# Patient Record
Sex: FEMALE | Race: WHITE | NOT HISPANIC OR LATINO | Employment: UNEMPLOYED | URBAN - METROPOLITAN AREA
[De-identification: names, ages, dates, MRNs, and addresses within clinical notes are randomized per-mention and may not be internally consistent; named-entity substitution may affect disease eponyms.]

---

## 2022-02-02 ENCOUNTER — HISTORICAL (OUTPATIENT)
Dept: ADMINISTRATIVE | Facility: HOSPITAL | Age: 42
End: 2022-02-02

## 2023-09-07 DIAGNOSIS — M25.561 CHRONIC PAIN OF RIGHT KNEE: Primary | ICD-10-CM

## 2023-09-07 DIAGNOSIS — G89.29 CHRONIC PAIN OF RIGHT KNEE: Primary | ICD-10-CM

## 2023-10-05 ENCOUNTER — OFFICE VISIT (OUTPATIENT)
Dept: ORTHOPEDICS | Facility: CLINIC | Age: 43
End: 2023-10-05
Payer: MEDICAID

## 2023-10-05 ENCOUNTER — HOSPITAL ENCOUNTER (OUTPATIENT)
Dept: RADIOLOGY | Facility: HOSPITAL | Age: 43
Discharge: HOME | End: 2023-10-05
Attending: NURSE PRACTITIONER
Payer: MEDICAID

## 2023-10-05 VITALS — BODY MASS INDEX: 23 KG/M2 | WEIGHT: 129.81 LBS | HEIGHT: 63 IN

## 2023-10-05 DIAGNOSIS — M71.22 BAKER'S CYST OF KNEE, LEFT: ICD-10-CM

## 2023-10-05 DIAGNOSIS — M25.561 CHRONIC PAIN OF RIGHT KNEE: ICD-10-CM

## 2023-10-05 DIAGNOSIS — M76.31 IT BAND SYNDROME, RIGHT: Primary | ICD-10-CM

## 2023-10-05 DIAGNOSIS — G89.29 CHRONIC PAIN OF RIGHT KNEE: ICD-10-CM

## 2023-10-05 DIAGNOSIS — M62.81 MUSCLE WEAKNESS OF LOWER EXTREMITY: ICD-10-CM

## 2023-10-05 DIAGNOSIS — R26.9 ALTERED GAIT: ICD-10-CM

## 2023-10-05 PROCEDURE — 4010F ACE/ARB THERAPY RXD/TAKEN: CPT | Mod: CPTII,,, | Performed by: NURSE PRACTITIONER

## 2023-10-05 PROCEDURE — 3008F BODY MASS INDEX DOCD: CPT | Mod: CPTII,,, | Performed by: NURSE PRACTITIONER

## 2023-10-05 PROCEDURE — 1159F PR MEDICATION LIST DOCUMENTED IN MEDICAL RECORD: ICD-10-PCS | Mod: CPTII,,, | Performed by: NURSE PRACTITIONER

## 2023-10-05 PROCEDURE — 99213 OFFICE O/P EST LOW 20 MIN: CPT | Mod: PBBFAC,25 | Performed by: NURSE PRACTITIONER

## 2023-10-05 PROCEDURE — 3008F PR BODY MASS INDEX (BMI) DOCUMENTED: ICD-10-PCS | Mod: CPTII,,, | Performed by: NURSE PRACTITIONER

## 2023-10-05 PROCEDURE — 99205 PR OFFICE/OUTPT VISIT, NEW, LEVL V, 60-74 MIN: ICD-10-PCS | Mod: S$PBB,25,, | Performed by: NURSE PRACTITIONER

## 2023-10-05 PROCEDURE — 4010F PR ACE/ARB THEARPY RXD/TAKEN: ICD-10-PCS | Mod: CPTII,,, | Performed by: NURSE PRACTITIONER

## 2023-10-05 PROCEDURE — 20610 DRAIN/INJ JOINT/BURSA W/O US: CPT | Mod: PBBFAC,RT | Performed by: NURSE PRACTITIONER

## 2023-10-05 PROCEDURE — 1159F MED LIST DOCD IN RCRD: CPT | Mod: CPTII,,, | Performed by: NURSE PRACTITIONER

## 2023-10-05 PROCEDURE — 1160F PR REVIEW ALL MEDS BY PRESCRIBER/CLIN PHARMACIST DOCUMENTED: ICD-10-PCS | Mod: CPTII,,, | Performed by: NURSE PRACTITIONER

## 2023-10-05 PROCEDURE — 99205 OFFICE O/P NEW HI 60 MIN: CPT | Mod: S$PBB,25,, | Performed by: NURSE PRACTITIONER

## 2023-10-05 PROCEDURE — 73564 X-RAY EXAM KNEE 4 OR MORE: CPT | Mod: TC,RT

## 2023-10-05 PROCEDURE — 1160F RVW MEDS BY RX/DR IN RCRD: CPT | Mod: CPTII,,, | Performed by: NURSE PRACTITIONER

## 2023-10-05 PROCEDURE — 20610 LARGE JOINT ASPIRATION/INJECTION: R KNEE: ICD-10-PCS | Mod: S$PBB,RT,, | Performed by: NURSE PRACTITIONER

## 2023-10-05 RX ORDER — TRIAMCINOLONE ACETONIDE 40 MG/ML
40 INJECTION, SUSPENSION INTRA-ARTICULAR; INTRAMUSCULAR
Status: COMPLETED | OUTPATIENT
Start: 2023-10-05 | End: 2023-10-05

## 2023-10-05 RX ORDER — MELOXICAM 7.5 MG/1
7.5 TABLET ORAL DAILY PRN
Qty: 30 TABLET | Refills: 1 | Status: SHIPPED | OUTPATIENT
Start: 2023-10-05 | End: 2023-12-04

## 2023-10-05 RX ORDER — HYDROXYZINE PAMOATE 50 MG/1
50 CAPSULE ORAL
COMMUNITY
Start: 2023-08-15

## 2023-10-05 RX ORDER — BUSPIRONE HYDROCHLORIDE 10 MG/1
10 TABLET ORAL 3 TIMES DAILY
COMMUNITY
Start: 2023-08-15

## 2023-10-05 RX ORDER — FLUOXETINE HYDROCHLORIDE 40 MG/1
40 CAPSULE ORAL EVERY MORNING
COMMUNITY
Start: 2023-08-01

## 2023-10-05 RX ORDER — QUETIAPINE FUMARATE 100 MG/1
100 TABLET, FILM COATED ORAL NIGHTLY
COMMUNITY
Start: 2023-07-24

## 2023-10-05 RX ORDER — OMEPRAZOLE 40 MG/1
CAPSULE, DELAYED RELEASE ORAL
COMMUNITY

## 2023-10-05 RX ORDER — LIDOCAINE HYDROCHLORIDE 10 MG/ML
5 INJECTION, SOLUTION EPIDURAL; INFILTRATION; INTRACAUDAL; PERINEURAL
Status: COMPLETED | OUTPATIENT
Start: 2023-10-05 | End: 2023-10-05

## 2023-10-05 RX ORDER — LISINOPRIL 10 MG/1
10 TABLET ORAL EVERY MORNING
COMMUNITY
Start: 2023-07-05

## 2023-10-05 RX ADMIN — TRIAMCINOLONE ACETONIDE 40 MG: 40 INJECTION, SUSPENSION INTRA-ARTICULAR; INTRAMUSCULAR at 07:10

## 2023-10-05 RX ADMIN — LIDOCAINE HYDROCHLORIDE 5 ML: 10 INJECTION, SOLUTION EPIDURAL; INFILTRATION; INTRACAUDAL; PERINEURAL at 07:10

## 2023-10-05 NOTE — PROGRESS NOTES
"  Subjective:   PATIENT ID: Soledad Andrews is a 43 y.o. female. Smoker. 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    Seen [facility] for same DX since n/a.   CHIEF COMPLAINT: Knee Pain of the Right Knee    HPI:    Right stabbing lateral knee pain.   Injury:  fell onto patellar in softball in highschool  Onset: several years ago fluctuates   Modifying Factors: worse with activity and improves with rest  Associated Symptoms: crepitus, swelling, "giving out", and "popping"   Activity: sedentary with light activity and pain moderately interferes with ADLs   Previous Treatments:  soft knee splint, HEP withTheraBand, RX NSAIDs, and RX PT completed 2 wks/ 1 xs per week d/c'd 2013  with some relief but symptoms returning  PMH: negative for contraindications for NSAID use  Family History: - OA    NOTE: New patient referred for right knee pain since high school with limited conservative treatments.  Symptoms affecting ADLs and ability to work.   Also c/o radiating pain to hip and foot with numbness in foot which she states is being caused by knee pain.  Patient frustrated with prolonged course of knee pain and desiring surgical options.      Current Outpatient Medications:     busPIRone (BUSPAR) 10 MG tablet, Take 10 mg by mouth 3 (three) times daily., Disp: , Rfl:     FLUoxetine 40 MG capsule, Take 40 mg by mouth every morning., Disp: , Rfl:     hydrOXYzine pamoate (VISTARIL) 50 MG Cap, Take 50 mg by mouth., Disp: , Rfl:     lisinopriL 10 MG tablet, Take 10 mg by mouth every morning., Disp: , Rfl:     omeprazole (PRILOSEC) 40 MG capsule, omeprazole Take No date recorded No form recorded No frequency recorded No route recorded No set duration recorded No set duration amount recorded active No dosage strength recorded No dosage strength units of measure recorded, Disp: , Rfl:     QUEtiapine (SEROQUEL) 100 MG Tab, Take 100 mg by mouth every evening., Disp: , Rfl:   Review of patient's allergies " "indicates:  No Known Allergies  No results found for: "HGBA1C"   Body mass index is 22.99 kg/m².   Vitals:    10/05/23 0746 10/05/23 0747   Weight: 58.9 kg (129 lb 12.8 oz)    Height: 5' 3" (1.6 m)    PainSc:    7      REVIEW OF SYSTEMS:  A ten-point review of systems was performed and is negative, except as mentioned above   Objective:   MSK Knee Exam  General:  no apparent distress, no pain indicators,  well nourished  Inspection:   lower extremities in proportion with overall body habitus, no erythema   , limping gait and right LE favoring, with walking patient elevates medial foot and applies pressure to lateral surface of foot, full weight bearing   LEFT KNEE RIGHT KNEE   no knee effusion, normal alignment, no contusion, no masses, no significant scars noted mild knee effusion, knee valgus with forefoot pronation, no contusion, no masses, no scars   Palpation:     LEFT KNEE RIGHT KNEE   normal temperature, no tenderness noted w/ palpation, no patellar grind with patella compression and no patellar facet pain, no crepitus, no quad deconditioning, no active mechanical locking noted w/ joint movement,  no tenderness of patellar tendon or tibial plateau, no fullness noted to popliteal bursa  normal temperature, noted tenderness over the lateral joint line and femorale condyle at IT band crossing, no patellar grind with patella compression and no patellar facet pain, no  crepitus, mild quad deconditioning, no active mechanical locking noted w/ joint movement,  no tenderness of pes anserine, patellar tendon or tibial plateau, mild fullness noted to popliteal bursa     ROM Active Flexion / Extension (0-140)  Left 0 / 135 w/o pain Right 0 / 121 w/ pain at extreme flexion/extension   Strength Flexion / Extension (5 / 5)  Left 5 / 5 Right 4 / 4     Special Testing:         IT Band Syndrome L+ L-- R+ R-- Not Tested    Victor Hugo's Test [] [x] [x] [] []    Joint Effusion         Ballotable Effusion [] [x] [] [x] []    Fluid Wave " [] [x] [] [x] []    Patellar Testing         Apprehension [] [x] [] [x] []    Meniscal Injury         Ras's Test [] [x] [] [x] []    Thessaly's Test [] [] [] [] [x]    Ligament Injury         ACL Anterior Drawer [] [x] [] [x] []    PCL Posterior Drawer [] [x] [] [x] []    LCL Varus Test [] [x] [] [x] []    MCL Valgus Test [] [x] [] [x] []      Hip Exam normal  Ankle Exam normal  Neurovascular: Intact to light touch  Neuro/ Psych: Awake, alert, oriented, normal mood and affect  Lymphatic: No LAD  Skin/ Soft Tissue: no rash, skin intact  Cardio: no edema, vascular integrity noted  Assessment:   IMAGING: X-ray 4 views of right knee ordered, reviewed and independently interpreted by me. Discussed with patient. No acute findings .  Awaiting radiologist findings.     MRI Knee Without Contrast Right 9/1/23  Impression  Likely ganglion cyst at the posterior medial aspect of the knee joint. While the medial meniscus appears intact, proximity of stenosis does raise the possibility of occult tear leading to parameniscal cyst.  Narrative  CLINICAL HISTORY:    Knee pain, history of cyst  COMPARISON:  Radiographs dated 8/23/2023.  TECHNIQUE:  Multiplanar MRI of the right knee was performed utilizing T1-weighted and fluid sensitive sequences.   CONTRAST:  None.  COMMENT:     There is no effusion or Baker's cyst. A 1.2 x 0.8 x 0.5 cm well-circumscribed fluid collection is seen at the posteromedial aspect of the knee joint, with adjacent 1 mm collection in the medial meniscal posterior horn.  The menisci, cruciate and medial collateral ligaments, lateral collateral ligamentous complex and extensor mechanism are intact.  There is no muscle atrophy or edema. The semimembranosus, biceps femoris, and popliteus tendons are intact.  No full thickness cartilage defects are seen.     MRI Hip Without Contrast Right 9/1/23  Impression  1. Tiny amount of fluid signal of the acetabular labrum identified without displacement of the  acetabular cartilage. Small acetabular labral tear cannot be excluded. If clinically warranted right hip MRI arthrogram is suggested.   2. No acute bony injuries. No evidence of avascular necrosis identified.   3. Incidental bilateral ovarian cystic changes.  Narrative  HISTORY/TECHNIQUE: Multiplanar, multiecho scanning of the right hip joint is completed. No contrast was administered. History of right hip pain. No prior images are available.   FINDINGS: The uterus is slightly deviated toward the left. Follicular ovarian cystic changes are noted bilaterally.   No acute bony injuries are identified. Bony signal intensity is not remarkable. Normal bony alignment. No evidence of avascular necrosis identified. There is no hip joint effusion. There is a tiny zone of fluid signal at the acetabulum best seen in the T2-weighted coronal plane images (series 7, images 13 and 14). Tiny nondisplaced labral tear cannot be excluded.    EMR REVIEW: completed with noted Referral documentation reviewed    DIAGNOSIS:  1. It band syndrome, right    2. Baker's cyst of knee, left    3. Altered gait    4. Muscle weakness of lower extremity    5. BMI 22.0-22.9, adult       Plan:     Orders Placed This Encounter    X-Ray Knee Complete 4 Or More Views Right     Ongoing education about DX and treatment recommendations including conservative treatments of daily HEP with TheraBand, BMI reduction goal 5-10% of body weight, muscle strengthening with use of stationary bike (RPM set at 80 or > with slow progression to goal of 40 minutes 3-4 times per week as tolerated), adequate vit D/C, glucosamine 1500 mg/day and daily acetaminophen 1000 mg 3 times/ day if able to tolerate.    Treatment plan: Moderate exacerbation of chronic Right  IT band syndrome with Baker's cyst complicated by LE weakness and dysfunctional gait   Recommend starting initial conservative treatments including: RX po NSAID , HEP with TheraBand > 6 weeks, formal RX PT, and CSI.   If inadequate at f/u will consider referral to ortho res. for surgical treatment options.   Procedure: CSI v. VS injections discussed, s/t failed conservative treatments patient consents to CSI today  for diagnostic reasons  RX Medications: continue medications as RX per PCP; RX meloxicam as directed, medications precautions given.   RTC: 4 months  NOTE:   Extensive time spent with patient educating on MRI findings which do not mandate surgery at this time.  Discussed treatment plan and rationale of need to improve function and weakness in order to improve symptoms r/t hip and foot pain.  Once PT completed if patient still wants surgical options I will refer her to ortho res. For eval at that time.        Caron Gleason FNP [facility]  Procedure Note:   Large Joint Aspiration/Injection: R knee    Date/Time: 10/5/2023 7:40 AM    Performed by: Caron Gleason NP  Authorized by: Caron Gleason NP    Location:  Knee  Site:  R knee  Medications:  5 mL LIDOCAINE 1% (PF) 50 mg / 5 mL; 40 mg KENALOG 40 mg / 1 mL     Ortho Procedure Note   Procedure: RIGHT Knee CSI lateral suprapatellar approach     Indications: Therapeutic Indication - Decrease pain, Increase range of motion and improve quality of life:   Risks:  Possible complications with the injection include bleeding, infection (.01%), tendon rupture, steroid flare, fat pad or soft tissue atrophy, skin depigmentation, allergic reaction to medications and vasovagal response. (steroid flare treatment is rest, ice, NSAIDs and resolves in 24-36 hours)  Consent:  Procedure, risks, benefits, and alternatives explained the patient, who voiced understanding and agreed to proceed with procedure.  Consent signed and scanned into the medical record.   No absolute contraindications (cellulitis overlying joint, infection, lack of informed consent, allergy to injection medication, AVN protein or egg allergy for sodium hyaluronate,  or history of steroid flare) or relative contraindications (uncontrolled DM2 A1c>10, coagulopathy, INR > 3.5, previous joint replacement or history of AVN).        Staff: Caron MAURO  Description:  Time-out performed.  The patient was prepped in normal sterile fashion use of chlorhexidine scrub and the appropriate and anatomic landmarks were identified.  Sterile 21 gauge 1.5 inch  was used. Topical ethyl chloride was applied. Contents of syringe included:     RIGHT KNEE: 5 ml of 1% of lidocaine (PF) with 40 mg of Kenalog      Drainage: n/a   Complications: None   EBL: None   Post Procedure: Patient alert, and moving all extremities. ROM improved, pain decreased.  Good peripheral pulses, no signs of vascular compromise and range of motion intact.  Aftercare instructions were given to patient at time of discharge.  Relative rest for 3 days-avoiding excess activity.  Place ice on the area for 15 minutes every 4-6 hours. Patient may take Tylenol a 1000 mg b.i.d. or ibuprofen 600 mg t.i.d. for the next 3-4 days if not on medication already and safe to take pending co-morbidities.  Protect the area for the next 1-8 hours if anesthetic was used.  Avoid excessive activity for the next 3-4 weeks.  ER precautions given for fever, severe joint pain or allergic reaction or other new symptoms related to the joint injection.         Time Based Billing   Total Time Spent with Patient: 60 minutes Excludes Time Spent Performing Procedure  Visit Start Time: 0800  10 minutes spent prior to visit reviewing EMR, prior labs and x-rays.  35 minutes spent in visit with patient face-to-face time completing exam, obtaining history, educating on DX and treatment plan.  15 minutes spent after visit completing EMR documentation.   Visit End Time: 0900    Please be aware that this note has been generated with the assistance of Alexis voice-to-text.  Please excuse any spelling or grammatical errors.

## 2024-02-06 ENCOUNTER — OFFICE VISIT (OUTPATIENT)
Dept: ORTHOPEDICS | Facility: CLINIC | Age: 44
End: 2024-02-06
Payer: MEDICAID

## 2024-02-06 VITALS
BODY MASS INDEX: 23.63 KG/M2 | WEIGHT: 133.38 LBS | HEIGHT: 63 IN | TEMPERATURE: 99 F | DIASTOLIC BLOOD PRESSURE: 75 MMHG | SYSTOLIC BLOOD PRESSURE: 111 MMHG | HEART RATE: 85 BPM

## 2024-02-06 DIAGNOSIS — M71.22 BAKER'S CYST OF KNEE, LEFT: ICD-10-CM

## 2024-02-06 DIAGNOSIS — M76.31 IT BAND SYNDROME, RIGHT: Primary | ICD-10-CM

## 2024-02-06 PROCEDURE — 4010F ACE/ARB THERAPY RXD/TAKEN: CPT | Mod: CPTII,,, | Performed by: NURSE PRACTITIONER

## 2024-02-06 PROCEDURE — 99213 OFFICE O/P EST LOW 20 MIN: CPT | Mod: PBBFAC | Performed by: NURSE PRACTITIONER

## 2024-02-06 PROCEDURE — 3074F SYST BP LT 130 MM HG: CPT | Mod: CPTII,,, | Performed by: NURSE PRACTITIONER

## 2024-02-06 PROCEDURE — 99214 OFFICE O/P EST MOD 30 MIN: CPT | Mod: S$PBB,,, | Performed by: NURSE PRACTITIONER

## 2024-02-06 PROCEDURE — 1160F RVW MEDS BY RX/DR IN RCRD: CPT | Mod: CPTII,,, | Performed by: NURSE PRACTITIONER

## 2024-02-06 PROCEDURE — 1159F MED LIST DOCD IN RCRD: CPT | Mod: CPTII,,, | Performed by: NURSE PRACTITIONER

## 2024-02-06 PROCEDURE — 3008F BODY MASS INDEX DOCD: CPT | Mod: CPTII,,, | Performed by: NURSE PRACTITIONER

## 2024-02-06 PROCEDURE — 3078F DIAST BP <80 MM HG: CPT | Mod: CPTII,,, | Performed by: NURSE PRACTITIONER

## 2024-02-06 RX ORDER — MELOXICAM 7.5 MG/1
7.5 TABLET ORAL DAILY
Qty: 30 TABLET | Refills: 0 | Status: SHIPPED | OUTPATIENT
Start: 2024-02-06 | End: 2024-03-07

## 2024-02-06 NOTE — PROGRESS NOTES
"  Subjective:   PATIENT ID: Soledad Andrews is a 43 y.o. female. Smoker. 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    Seen [facility] for same DX since n/a.   CHIEF COMPLAINT: Knee Pain of the Right Knee (F/U Rt Knee pain. Pain Level 6-7 today. Taking meloxicam PRN)    HPI:    Right stabbing lateral knee pain.  Radiates into hip and into foot w/ numbness.   Injury:  fell onto patellar in softball in highschool  Onset: several years ago fluctuates   Modifying Factors: worse with activity and improves with rest  Associated Symptoms: crepitus, swelling, "giving out", and "popping"   Activity: sedentary with light activity and pain moderately interferes with ADLs   Previous Treatments:  soft knee splint, HEP withTheraBand, OTC pain relievers: acetaminophen, ibuprofen  , RX medications: meloxicam, RX PT completed 2 wks/ 1 xs per week d/c'd 2013, and CSI since 2023 last injection 10/5/23  with some relief but symptoms returning  PMH: negative for contraindications for NSAID use; HX drug abuse  Family History: - OA    NOTE: Follow up for right knee pain with Baker's cyst and IT Band Syndrome.  CSI given 10/5/23 for diagnostic reasons, injection provided some relief, lasting approximately 2 weeks.  Symptoms returning and continues with pain affecting ADLs.  Interested in surgical treatment options due to failed conservative treatments.   Completed 2 weeks of RX PT then did not return for further sessions due to PT making symptoms worse.  Also c/o radiating pain from hip and foot with numbness in foot which she adamantly believes is being caused by knee pain.  Patient frustrated with prolonged course of knee pain and desiring surgical options.  Requesting refill on meloxicam.     Current Outpatient Medications:     busPIRone (BUSPAR) 10 MG tablet, Take 10 mg by mouth 3 (three) times daily., Disp: , Rfl:     FLUoxetine 40 MG capsule, Take 40 mg by mouth every morning., Disp: , Rfl:     hydrOXYzine " "pamoate (VISTARIL) 50 MG Cap, Take 50 mg by mouth., Disp: , Rfl:     lisinopriL 10 MG tablet, Take 10 mg by mouth every morning., Disp: , Rfl:     omeprazole (PRILOSEC) 40 MG capsule, omeprazole Take No date recorded No form recorded No frequency recorded No route recorded No set duration recorded No set duration amount recorded active No dosage strength recorded No dosage strength units of measure recorded, Disp: , Rfl:     QUEtiapine (SEROQUEL) 100 MG Tab, Take 100 mg by mouth every evening., Disp: , Rfl:     meloxicam (MOBIC) 7.5 MG tablet, Take 1 tablet (7.5 mg total) by mouth once daily., Disp: 30 tablet, Rfl: 0  Review of patient's allergies indicates:  No Known Allergies  No results found for: "HGBA1C"   Body mass index is 23.63 kg/m².   Vitals:    02/06/24 0742 02/06/24 0743   BP:  111/75   Pulse:  85   Temp:  99 °F (37.2 °C)   TempSrc:  Oral   Weight: 60.5 kg (133 lb 6.4 oz)    Height: 5' 3" (1.6 m)    PainSc:    6      REVIEW OF SYSTEMS:  A ten-point review of systems was performed and is negative, except as mentioned above   Objective:   MSK Knee Exam  General:  no apparent distress, no pain indicators,  well nourished  Inspection:   lower extremities in proportion with overall body habitus, no erythema   , limping gait and right LE favoring, with walking patient elevates medial foot and applies pressure to lateral surface of foot, full weight bearing   LEFT KNEE RIGHT KNEE   no knee effusion, normal alignment, no contusion, no masses, no significant scars noted mild knee effusion, knee valgus with forefoot pronation, no contusion, no masses, no scars   Palpation:     LEFT KNEE RIGHT KNEE   normal temperature, no tenderness noted w/ palpation, no patellar grind with patella compression and no patellar facet pain, no crepitus, no quad deconditioning, no active mechanical locking noted w/ joint movement,  no tenderness of patellar tendon or tibial plateau, no fullness noted to popliteal bursa  normal " temperature, noted tenderness over the lateral joint line and femorale condyle at IT band crossing, no patellar grind with patella compression and no patellar facet pain, no  crepitus, mild quad deconditioning, no active mechanical locking noted w/ joint movement,  no tenderness of pes anserine, patellar tendon or tibial plateau, mild fullness noted to popliteal bursa     ROM Active Flexion / Extension (0-140)  Left 0 / 135 w/o pain Right 0 / 121 w/ pain at extreme flexion/extension   Strength Flexion / Extension (5 / 5)  Left 5 / 5 Right 4 / 4     Special Testing:         IT Band Syndrome L+ L-- R+ R-- Not Tested    Victor Hugo's Test [] [x] [x] [] []    Joint Effusion         Ballotable Effusion [] [x] [] [x] []    Fluid Wave [] [x] [] [x] []    Patellar Testing         Apprehension [] [x] [] [x] []    Meniscal Injury         Ras's Test [] [x] [] [x] []    Thessaly's Test [] [] [] [] [x]    Ligament Injury         ACL Anterior Drawer [] [x] [] [x] []    PCL Posterior Drawer [] [x] [] [x] []    LCL Varus Test [] [x] [] [x] []    MCL Valgus Test [] [x] [] [x] []      Hip Exam normal  Ankle Exam normal  Neurovascular: Intact to light touch  Neuro/ Psych: Awake, alert, oriented, normal mood and affect  Lymphatic: No LAD  Skin/ Soft Tissue: no rash, skin intact  Cardio: no edema, vascular integrity noted  Assessment:   IMAGING: X-ray 4 views of right knee dated 10/5/23, reviewed and independently interpreted by me. Discussed with patient. No acute findings .    XR KNEE COMP 4 OR MORE VIEWS RIGHT 10/5/23  CLINICAL HISTORY:  Pain in right knee  COMPARISON:  None.  FINDINGS:  No acute displaced fractures or dislocations.  Joint spaces preserved.  No blastic or lytic lesions.  Soft tissues within normal limits.  Impression:  No acute osseous abnormality.    MRI Knee Without Contrast Right 9/1/23  Impression  Likely ganglion cyst at the posterior medial aspect of the knee joint. While the medial meniscus appears intact,  proximity of stenosis does raise the possibility of occult tear leading to parameniscal cyst.  Narrative  CLINICAL HISTORY:    Knee pain, history of cyst  COMPARISON:  Radiographs dated 8/23/2023.  TECHNIQUE:  Multiplanar MRI of the right knee was performed utilizing T1-weighted and fluid sensitive sequences.   CONTRAST:  None.  COMMENT:     There is no effusion or Baker's cyst. A 1.2 x 0.8 x 0.5 cm well-circumscribed fluid collection is seen at the posteromedial aspect of the knee joint, with adjacent 1 mm collection in the medial meniscal posterior horn.  The menisci, cruciate and medial collateral ligaments, lateral collateral ligamentous complex and extensor mechanism are intact.  There is no muscle atrophy or edema. The semimembranosus, biceps femoris, and popliteus tendons are intact.  No full thickness cartilage defects are seen.     MRI Hip Without Contrast Right 9/1/23  Impression  1. Tiny amount of fluid signal of the acetabular labrum identified without displacement of the acetabular cartilage. Small acetabular labral tear cannot be excluded. If clinically warranted right hip MRI arthrogram is suggested.   2. No acute bony injuries. No evidence of avascular necrosis identified.   3. Incidental bilateral ovarian cystic changes.  Narrative  HISTORY/TECHNIQUE: Multiplanar, multiecho scanning of the right hip joint is completed. No contrast was administered. History of right hip pain. No prior images are available.   FINDINGS: The uterus is slightly deviated toward the left. Follicular ovarian cystic changes are noted bilaterally.   No acute bony injuries are identified. Bony signal intensity is not remarkable. Normal bony alignment. No evidence of avascular necrosis identified. There is no hip joint effusion. There is a tiny zone of fluid signal at the acetabulum best seen in the T2-weighted coronal plane images (series 7, images 13 and 14). Tiny nondisplaced labral tear cannot be excluded.    EMR REVIEW:  completed with noted prior visit 10/5/23 reviewed.    DIAGNOSIS:  1. It band syndrome, right    2. Baker's cyst of knee, left    3. BMI 23.0-23.9, adult      Plan:     Orders Placed This Encounter    meloxicam (MOBIC) 7.5 MG tablet     Ongoing education about DX and treatment recommendations including conservative treatments of daily HEP with TheraBand, BMI reduction goal 5-10% of body weight, muscle strengthening with use of stationary bike (RPM set at 80 or > with slow progression to goal of 40 minutes 3-4 times per week as tolerated), adequate vit D/C, glucosamine 1500 mg/day and daily acetaminophen 1000 mg 3 times/ day if able to tolerate.    Treatment plan: Moderate exacerbation of chronic Right  IT band syndrome with Baker's cyst complicated by LE weakness and dysfunctional gait   Extended time spent discussing MRI findings and educating patient on treatment options.  Aware findings of MRI, XR and exam do not mandate surgery.  Discussed non-surgical treatments including RX PT and CSI for symptoms relief.  Patient desires surgical treatment options at this time.  I will refer to [facility] orthopedic surgeon residents for evaluation. Patient is aware I am not guaranteeing surgery. Determination of appropriateness for surgery will be made by surgeon at evaluation.    Recommended patient f/u with PCP r/t possible lower back evaluation and referral to neurologist if found to be appropriate r/t radiating pain and numbness in LE.  Procedure: n/a    RX Medications: continue medications as RX per PCP; RX meloxicam as directed, medications precautions given.  Refill given today per patient request.  RTC: next available appt. with ortho res.   NOTE: None       Caron Kauffmanslucien [facility]  Procedure Note:   None  Time Based Billing   Total Time Spent with Patient: 30 minutes .  Visit Start Time: 0815  10 minutes spent prior to visit reviewing EMR, prior labs and x-rays.  10 minutes  spent in visit with patient face-to-face time completing exam, obtaining history, educating on DX and treatment plan.  10 minutes spent after visit completing EMR documentation.   Visit End Time: 0845    Please be aware that this note has been generated with the assistance of MMparisa voice-to-text.  Please excuse any spelling or grammatical errors.

## 2024-02-26 ENCOUNTER — HOSPITAL ENCOUNTER (OUTPATIENT)
Dept: RADIOLOGY | Facility: HOSPITAL | Age: 44
Discharge: HOME | End: 2024-02-26
Attending: STUDENT IN AN ORGANIZED HEALTH CARE EDUCATION/TRAINING PROGRAM
Payer: MEDICAID

## 2024-02-26 ENCOUNTER — OFFICE VISIT (OUTPATIENT)
Dept: ORTHOPEDICS | Facility: CLINIC | Age: 44
End: 2024-02-26
Payer: MEDICAID

## 2024-02-26 VITALS
DIASTOLIC BLOOD PRESSURE: 78 MMHG | SYSTOLIC BLOOD PRESSURE: 112 MMHG | WEIGHT: 131 LBS | OXYGEN SATURATION: 99 % | HEART RATE: 90 BPM | RESPIRATION RATE: 20 BRPM | HEIGHT: 63 IN | BODY MASS INDEX: 23.21 KG/M2 | TEMPERATURE: 99 F

## 2024-02-26 DIAGNOSIS — G89.29 CHRONIC PAIN OF RIGHT KNEE: ICD-10-CM

## 2024-02-26 DIAGNOSIS — M25.561 CHRONIC PAIN OF RIGHT KNEE: ICD-10-CM

## 2024-02-26 DIAGNOSIS — M71.22 BAKER'S CYST OF KNEE, LEFT: Primary | ICD-10-CM

## 2024-02-26 DIAGNOSIS — M76.31 IT BAND SYNDROME, RIGHT: ICD-10-CM

## 2024-02-26 PROCEDURE — 4010F ACE/ARB THERAPY RXD/TAKEN: CPT | Mod: CPTII,,, | Performed by: ORTHOPAEDIC SURGERY

## 2024-02-26 PROCEDURE — 73564 X-RAY EXAM KNEE 4 OR MORE: CPT | Mod: TC,RT

## 2024-02-26 PROCEDURE — 1159F MED LIST DOCD IN RCRD: CPT | Mod: CPTII,,, | Performed by: ORTHOPAEDIC SURGERY

## 2024-02-26 PROCEDURE — 3008F BODY MASS INDEX DOCD: CPT | Mod: CPTII,,, | Performed by: ORTHOPAEDIC SURGERY

## 2024-02-26 PROCEDURE — 3074F SYST BP LT 130 MM HG: CPT | Mod: CPTII,,, | Performed by: ORTHOPAEDIC SURGERY

## 2024-02-26 PROCEDURE — 99214 OFFICE O/P EST MOD 30 MIN: CPT | Mod: S$PBB,,, | Performed by: ORTHOPAEDIC SURGERY

## 2024-02-26 PROCEDURE — 3078F DIAST BP <80 MM HG: CPT | Mod: CPTII,,, | Performed by: ORTHOPAEDIC SURGERY

## 2024-02-26 PROCEDURE — 73502 X-RAY EXAM HIP UNI 2-3 VIEWS: CPT | Mod: TC,RT

## 2024-02-26 PROCEDURE — 72114 X-RAY EXAM L-S SPINE BENDING: CPT | Mod: TC

## 2024-02-26 PROCEDURE — 99215 OFFICE O/P EST HI 40 MIN: CPT | Mod: PBBFAC,25

## 2024-02-26 RX ORDER — METHYLPREDNISOLONE 4 MG/1
TABLET ORAL
Qty: 21 EACH | Refills: 0 | Status: SHIPPED | OUTPATIENT
Start: 2024-02-26 | End: 2024-03-18

## 2024-02-26 NOTE — PROGRESS NOTES
Faculty Attestation: Soledad Andrews  was seen at [facility] in the Orthopaedic Clinic. Patient seen and evaluated at the time of the visit. History of Present Illness, Physical Exam, and Assessment and Plan reviewed. Treatment plan is reasonable and appropriate. Compliance with treatment recommendations is important. No procedure was performed.     Patient was seen, examined, and chart was reviewed today.  We will start physical therapy for her right hip and knee.  We have discussed her lower back x-rays as well.  We have discussed a Medrol Dosepak with appropriate precautions.  We have discussed possible referred pain.  Plan to see her back in clinic after physical therapy.    Jason Cam MD  Orthopaedic Surgery

## 2024-02-26 NOTE — PROGRESS NOTES
[facility]  Established Patient Office Visit  02/26/2024       Patient ID: Soledad Andrews  YOB: 1980  MRN: [id number]    Chief Complaint: Follow-up and Pain of the Right Knee (Cyst behind right knee pain 6/10, c/o right hip pain also completed PT)      Past Orthopaedic Surgeries:  None    HPI:  Soledad Andrews is a 43 y.o. female with chief complaint of right knee and hip pain.  This pain has been ongoing since she was a teenager.  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  She had some injuries then.  Nothing that required formal treatment.  She was on meloxicam currently which provides her with minimal relief.  She has had a corticosteroid injection to her right knee in October which provided her with no relief.  She went to about 3 sessions of physical therapy, but quit because she was not experiencing any relief.  She reports that her pain is a throbbing pain that goes from her lateral knee up to her hip laterally.  She denies any groin pain.  She reports occasional lumbar back pain.  Denies any bowel or bladder incontinence.  She does report numbness and tingling that goes hip down the lateral aspect of her knee.  She reports she has clicking in the right knee.  No instability of the right knee.    Smokes nearly a pack a day.  Denies recreational/illicit drug use.  Past medical history includes hypertension, depression, anxiety    12 point ROS performed and negative except as above.     Past Medical History:    Past Medical History:   Diagnosis Date    Essential (primary) hypertension      History reviewed. No pertinent surgical history.  Family History   Problem Relation Age of Onset    Hypertension Mother     Hypertension Brother     Hypertension Paternal Grandmother     Diabetes Paternal Grandmother      Social History     Socioeconomic History    Marital status: Single   Tobacco Use    Smoking status: Every Day     Current packs/day: 1.00      Types: Cigarettes    Smokeless tobacco: Never     Medication List with Changes/Refills   Current Medications    BUSPIRONE (BUSPAR) 10 MG TABLET    Take 10 mg by mouth 3 (three) times daily.    FLUOXETINE 40 MG CAPSULE    Take 40 mg by mouth every morning.    HYDROXYZINE PAMOATE (VISTARIL) 50 MG CAP    Take 50 mg by mouth.    LISINOPRIL 10 MG TABLET    Take 10 mg by mouth every morning.    MELOXICAM (MOBIC) 7.5 MG TABLET    Take 1 tablet (7.5 mg total) by mouth once daily.    OMEPRAZOLE (PRILOSEC) 40 MG CAPSULE    omeprazole Take No date recorded No form recorded No frequency recorded No route recorded No set duration recorded No set duration amount recorded active No dosage strength recorded No dosage strength units of measure recorded    QUETIAPINE (SEROQUEL) 100 MG TAB    Take 100 mg by mouth every evening.     Review of patient's allergies indicates:  No Known Allergies    Physical Exam:    Right knee  No gross deformity, minimal effusion, no open wounds  Tender to palpation over the greater trochanter bilaterally right worse than left  Pain at the lateral ITB band insertion, mild lateral joint line tenderness, no medial joint line tenderness  No pain with Stinchfield, pain with figure 4, external rotation to 60, internal rotation to 40, no pain with internal rotation of the hip at the groin  Motor intact: EHL/FHL/TA/GSC  Negative anterior drawer, negative posterior drawer, stable Lachman's, and no instability to varus and valgus stress at 0 and 30°  She reports altered sensation throughout her foot  Full ROM of hip, knee, ankle  Palp DP pulse    Imaging independently interpreted:  X-rays of the lumbar spine show very mild facet arthropathy, overall disc heights appear to be maintained, no listhesis  X-rays of the right hip show minimal arthritis, no acute fracture dislocation  X-rays of the right knee shows minimal medial joint space narrowing, no acute fracture dislocation  MRI of the right knee shows good  cartilage, ACL, PCL, medial and lateral meniscus intact, MCL and LCL intact, there is a small fluid collection posteromedially that it was just posterior to the medial meniscus root which could be an early Baker's cyst    Assessment and Plan:    Soledad Andrews is a 43 y.o. female with right IT band syndrome    -IT band stretching daily  -formal physical therapy  -Medrol Dosepak  -continue anti-inflammatory  -if her symptoms return, repeat MRI of the right knee  -activities as tolerated  -follow up in 3 months with repeat weightbearing x-rays of the right hip and right knee    Jose Francisco Selby MD  [facility] PGY-3          Orders Placed This Encounter    X-Ray Knee Complete 4 Or More Views Right    X-Ray Hip 2 or 3 views Right (with Pelvis when performed)    X-Ray Lumbar Complete Including Flex And Ext